# Patient Record
Sex: FEMALE | Race: WHITE
[De-identification: names, ages, dates, MRNs, and addresses within clinical notes are randomized per-mention and may not be internally consistent; named-entity substitution may affect disease eponyms.]

---

## 2017-12-21 ENCOUNTER — HOSPITAL ENCOUNTER (OUTPATIENT)
Dept: HOSPITAL 8 - OR | Age: 77
Discharge: HOME | End: 2017-12-21
Attending: PLASTIC SURGERY
Payer: COMMERCIAL

## 2017-12-21 VITALS — SYSTOLIC BLOOD PRESSURE: 114 MMHG | DIASTOLIC BLOOD PRESSURE: 71 MMHG

## 2017-12-21 DIAGNOSIS — Z87.891: ICD-10-CM

## 2017-12-21 DIAGNOSIS — N64.1: Primary | ICD-10-CM

## 2017-12-21 DIAGNOSIS — Z88.0: ICD-10-CM

## 2017-12-21 DIAGNOSIS — Z85.3: ICD-10-CM

## 2017-12-21 PROCEDURE — 14000 TIS TRNFR TRUNK 10 SQ CM/<: CPT

## 2018-03-28 ENCOUNTER — TELEPHONE (OUTPATIENT)
Dept: HEMATOLOGY ONCOLOGY | Facility: MEDICAL CENTER | Age: 78
End: 2018-03-28

## 2018-03-28 NOTE — TELEPHONE ENCOUNTER
Federica called inquiring about a genetic referral that was faxed the beginning of the year.  I do not have a referral on file for her.  Explained the process and emailed the questionnaire to [email or web address].  Contacted Dr. Torres office and they are re-faxing her referral

## 2018-04-05 ENCOUNTER — TELEPHONE (OUTPATIENT)
Dept: HEMATOLOGY ONCOLOGY | Facility: MEDICAL CENTER | Age: 78
End: 2018-04-05

## 2018-04-05 ENCOUNTER — OFFICE VISIT (OUTPATIENT)
Dept: HEMATOLOGY ONCOLOGY | Facility: MEDICAL CENTER | Age: 78
End: 2018-04-05
Payer: MEDICARE

## 2018-04-05 ENCOUNTER — NON-PROVIDER VISIT (OUTPATIENT)
Dept: HEMATOLOGY ONCOLOGY | Facility: MEDICAL CENTER | Age: 78
End: 2018-04-05
Payer: MEDICARE

## 2018-04-05 VITALS
BODY MASS INDEX: 26.27 KG/M2 | RESPIRATION RATE: 16 BRPM | OXYGEN SATURATION: 100 % | WEIGHT: 153.88 LBS | TEMPERATURE: 98.8 F | SYSTOLIC BLOOD PRESSURE: 140 MMHG | HEIGHT: 64 IN | HEART RATE: 90 BPM | DIASTOLIC BLOOD PRESSURE: 80 MMHG

## 2018-04-05 DIAGNOSIS — Z80.0 FAMILY HISTORY OF COLON CANCER: ICD-10-CM

## 2018-04-05 DIAGNOSIS — Z80.0 FAMILY HISTORY OF PANCREATIC CANCER: ICD-10-CM

## 2018-04-05 DIAGNOSIS — C50.111 MALIGNANT NEOPLASM OF CENTRAL PORTION OF RIGHT FEMALE BREAST, UNSPECIFIED ESTROGEN RECEPTOR STATUS (HCC): ICD-10-CM

## 2018-04-05 DIAGNOSIS — Z15.01 BREAST CANCER GENETIC SUSCEPTIBILITY: ICD-10-CM

## 2018-04-05 DIAGNOSIS — Z80.41 FAMILY HISTORY OF OVARIAN CANCER: ICD-10-CM

## 2018-04-05 DIAGNOSIS — Z80.49 FAMILY HISTORY OF UTERINE CANCER: ICD-10-CM

## 2018-04-05 DIAGNOSIS — Z80.8: ICD-10-CM

## 2018-04-05 PROCEDURE — 99204 OFFICE O/P NEW MOD 45 MIN: CPT | Performed by: MEDICAL GENETICS

## 2018-04-05 PROCEDURE — 36415 COLL VENOUS BLD VENIPUNCTURE: CPT | Performed by: MEDICAL GENETICS

## 2018-04-05 RX ORDER — GABAPENTIN 100 MG/1
100 CAPSULE ORAL 3 TIMES DAILY
COMMUNITY
End: 2019-05-14

## 2018-04-05 RX ORDER — ANASTROZOLE 1 MG/1
1 TABLET ORAL DAILY
COMMUNITY
End: 2023-09-06

## 2018-04-05 RX ORDER — IBUPROFEN 200 MG
950 CAPSULE ORAL DAILY
COMMUNITY
End: 2019-05-14

## 2018-04-05 RX ORDER — M-VIT,TX,IRON,MINS/CALC/FOLIC 27MG-0.4MG
1 TABLET ORAL DAILY
COMMUNITY
End: 2023-09-06

## 2018-04-05 RX ORDER — AMOXICILLIN 250 MG
1 CAPSULE ORAL DAILY
COMMUNITY
End: 2023-09-06

## 2018-04-05 ASSESSMENT — PAIN SCALES - GENERAL: PAINLEVEL: NO PAIN

## 2018-04-05 NOTE — PROGRESS NOTES
"Federica Figueroa is a 77 y.o. year old patient who was referred for cancer genetic risk assessment     Chief Complaint:   Chief Complaint   Patient presents with   • New Patient     Ref:  Dx: Breast Cancer       HPI: The patient was well until 5 months ago at which time a suspicious physical exam caused the patient to be referred for imaging . A suspicious mammogram study identified a (right) breast mass. A biopsy ultrasound guided was performed and a specimen was submitted to pathology.     A diagnosis of lobular carcinoma was made.   Review of personal and family histories suggested that a cancer genetic risk assessment was in order.    Location right Severity moderate Timing recent Modifying Factors none  Quality none     Duration 5 months           Context none           Associated Signs and Symptoms none    Review of Systems (ROS):  Constitutional normal   Eyes normal   ENT recent tinnitus   Respiratory normal   Cardiovascular normal   Gastrointestinal normal   Genitourinary normal   Musculoskeletal normal   Skin normal   Neurological normal   Endocrine normal   Psychiatric normal   Hematologic/lymphatic normal   Allergic/Immunologic sensitivities to penicillins only  Remaining systems negative? Yes  Total review of symptoms  >10:       History (Past/Family/ROS)  Family History:    3 generation pedigree built  Yes   Luceroer-Cuca empiric risk calculation No   Toan II empiric risk calculation  No  Social History     Yes  Patient Past History     Yes  History areas assessed   3:     Physical Exam  Constitutional    Encounter Vitals  Standard Vitals  Vitals  Blood Pressure : 140/80  Temperature: 37.1 °C (98.8 °F)  Pulse: 90  Respiration: 16  Pulse Oximetry: 100 %  Height: 162.6 cm (5' 4\")  Weight: 69.8 kg (153 lb 14.1 oz)  Encounter Vitals  Temperature: 37.1 °C (98.8 °F)  Temp Source: Temporal  Blood Pressure : 140/80  BP Location: Left, Upper Arm  Patient BP Position: Sitting  Pulse: 90  Respiration: " "16  Pulse Oximetry: 100 %  Weight: 69.8 kg (153 lb 14.1 oz)  How Weight Obtained: Stand Up Scale  Height: 162.6 cm (5' 4\")  BMI (Calculated): 26.41  Pain Score: No pain  Pulmonary-Specific Vitals     Durable Medical Equipment-Specific Vitals              General appearance: normal    Head Circumference: 22 inches  Eyes    Conjunctiva: clear   Pupils: reactive to light and accomodation     ENMT    External inspection: normal    Assessment of Hearing: normal    Lips/cheeks/gums: no lesions  Neck   External exam: normal    Thyroid: no masses   Respiratory   Auscultation: clear in all fields      Cardiovascular   Auscultation: RRR with no murmers   Edema : none  Chest   Breasts (exam): not done   Palpation:   GI   External exam and palpation: normal      Pelvic (female): not done   External exam (male):   Lymphatic   Palpation in 2 areas: normal     Musculoskeletal   Gait: normal    Digits and Nails: no lesions  Skin   Inspection: normal    Palpation: no masses   Neurologic   Cranial nerves: intact   DTR’s: 1+ and equal  PE summary    18 bullets (of 25 total):     Problem Points   New, further evaluation planned (4)    Moderate  risk    DATA POINTS   >4:     PROBLEM POINTS  >4:   RISK  Intermediate    TYPE OF MEDICAL DECISION MAKING High      60 minutes TIME (FACE TO FACE) AND DOCUMENTED  DOCUMENTATION DESCRIBE CONTENTS OF COUNSELING/CARE COORDINATION  DOCUMENTATION SUPPORT >50% TIME SPENT IN COUNSELING/COORDINATION        00936 (60 minutes)  with Moderate complexity    Patient with diagnosis of breast cancer and family history of ovarian cancer (2 family members), uterine cancer and colorectal cancer    Ambry panel sent                         "

## 2018-04-05 NOTE — TELEPHONE ENCOUNTER
Patient called and would like to speak to you regarding her exam she had today? She didn't want to give me any other information she was strictly asking for JG Thompson

## 2018-04-06 VITALS
WEIGHT: 153.88 LBS | RESPIRATION RATE: 16 BRPM | BODY MASS INDEX: 26.27 KG/M2 | OXYGEN SATURATION: 100 % | HEART RATE: 90 BPM | TEMPERATURE: 98.8 F | HEIGHT: 64 IN

## 2018-04-06 ASSESSMENT — PAIN SCALES - GENERAL: PAINLEVEL: NO PAIN

## 2018-04-06 NOTE — PROGRESS NOTES
Federica Figueroa is a 77 y.o. female here for a non-provider visit for: Lab Draws  on 4/6/2018 at 2:28 PM    Procedure Performed: Venipuncture     Anatomical site: Right Wrist     Equipment used: 23g Butterfly    Labs drawn: [employer]    Ordering Provider: Dr. Mir Fagan    Dyllan By: Donna Lazo, Med Ass't   No complications.  was present the whole time when I was drawing this patients labs.

## 2019-05-14 ENCOUNTER — HOSPITAL ENCOUNTER (OUTPATIENT)
Facility: MEDICAL CENTER | Age: 79
End: 2019-05-14
Attending: INTERNAL MEDICINE
Payer: MEDICARE

## 2019-05-14 ENCOUNTER — APPOINTMENT (OUTPATIENT)
Dept: ADMISSIONS | Facility: MEDICAL CENTER | Age: 79
End: 2019-05-14
Attending: PLASTIC SURGERY
Payer: COMMERCIAL

## 2019-05-14 DIAGNOSIS — Z01.810 PRE-OPERATIVE CARDIOVASCULAR EXAMINATION: ICD-10-CM

## 2019-05-14 DIAGNOSIS — Z01.812 PRE-OPERATIVE LABORATORY EXAMINATION: ICD-10-CM

## 2019-05-14 LAB
ALBUMIN SERPL BCP-MCNC: 4.3 G/DL (ref 3.2–4.9)
ALBUMIN/GLOB SERPL: 1.4 G/DL
ALP SERPL-CCNC: 81 U/L (ref 30–99)
ALT SERPL-CCNC: 13 U/L (ref 2–50)
ANION GAP SERPL CALC-SCNC: 7 MMOL/L (ref 0–11.9)
AST SERPL-CCNC: 22 U/L (ref 12–45)
BASOPHILS # BLD AUTO: 1.2 % (ref 0–1.8)
BASOPHILS # BLD: 0.05 K/UL (ref 0–0.12)
BILIRUB SERPL-MCNC: 0.6 MG/DL (ref 0.1–1.5)
BUN SERPL-MCNC: 11 MG/DL (ref 8–22)
CALCIUM SERPL-MCNC: 10.3 MG/DL (ref 8.5–10.5)
CHLORIDE SERPL-SCNC: 104 MMOL/L (ref 96–112)
CO2 SERPL-SCNC: 25 MMOL/L (ref 20–33)
CREAT SERPL-MCNC: 0.84 MG/DL (ref 0.5–1.4)
EKG IMPRESSION: NORMAL
EOSINOPHIL # BLD AUTO: 0.14 K/UL (ref 0–0.51)
EOSINOPHIL NFR BLD: 3.4 % (ref 0–6.9)
ERYTHROCYTE [DISTWIDTH] IN BLOOD BY AUTOMATED COUNT: 46.7 FL (ref 35.9–50)
GLOBULIN SER CALC-MCNC: 3 G/DL (ref 1.9–3.5)
GLUCOSE SERPL-MCNC: 93 MG/DL (ref 65–99)
HCT VFR BLD AUTO: 44.4 % (ref 37–47)
HGB BLD-MCNC: 14.4 G/DL (ref 12–16)
IMM GRANULOCYTES # BLD AUTO: 0.01 K/UL (ref 0–0.11)
IMM GRANULOCYTES NFR BLD AUTO: 0.2 % (ref 0–0.9)
LYMPHOCYTES # BLD AUTO: 1.37 K/UL (ref 1–4.8)
LYMPHOCYTES NFR BLD: 33.2 % (ref 22–41)
MCH RBC QN AUTO: 32.4 PG (ref 27–33)
MCHC RBC AUTO-ENTMCNC: 32.4 G/DL (ref 33.6–35)
MCV RBC AUTO: 99.8 FL (ref 81.4–97.8)
MONOCYTES # BLD AUTO: 0.35 K/UL (ref 0–0.85)
MONOCYTES NFR BLD AUTO: 8.5 % (ref 0–13.4)
NEUTROPHILS # BLD AUTO: 2.21 K/UL (ref 2–7.15)
NEUTROPHILS NFR BLD: 53.5 % (ref 44–72)
NRBC # BLD AUTO: 0 K/UL
NRBC BLD-RTO: 0 /100 WBC
PLATELET # BLD AUTO: 225 K/UL (ref 164–446)
PMV BLD AUTO: 9.1 FL (ref 9–12.9)
POTASSIUM SERPL-SCNC: 4.4 MMOL/L (ref 3.6–5.5)
PROT SERPL-MCNC: 7.3 G/DL (ref 6–8.2)
RBC # BLD AUTO: 4.45 M/UL (ref 4.2–5.4)
SODIUM SERPL-SCNC: 136 MMOL/L (ref 135–145)
WBC # BLD AUTO: 4.1 K/UL (ref 4.8–10.8)

## 2019-05-14 PROCEDURE — 85025 COMPLETE CBC W/AUTO DIFF WBC: CPT

## 2019-05-14 PROCEDURE — 86300 IMMUNOASSAY TUMOR CA 15-3: CPT

## 2019-05-14 PROCEDURE — 80053 COMPREHEN METABOLIC PANEL: CPT

## 2019-05-14 PROCEDURE — 36415 COLL VENOUS BLD VENIPUNCTURE: CPT

## 2019-05-14 RX ORDER — MULTIVIT WITH MINERALS/LUTEIN
1000 TABLET ORAL DAILY
COMMUNITY
End: 2023-09-06

## 2019-05-16 LAB — CANCER AG27-29 SERPL-ACNC: 17.8 U/ML (ref 0–40)

## 2019-05-23 ENCOUNTER — ANESTHESIA EVENT (OUTPATIENT)
Dept: SURGERY | Facility: MEDICAL CENTER | Age: 79
End: 2019-05-23
Payer: COMMERCIAL

## 2019-05-23 ENCOUNTER — HOSPITAL ENCOUNTER (OUTPATIENT)
Facility: MEDICAL CENTER | Age: 79
End: 2019-05-23
Attending: PLASTIC SURGERY | Admitting: PLASTIC SURGERY
Payer: COMMERCIAL

## 2019-05-23 ENCOUNTER — ANESTHESIA (OUTPATIENT)
Dept: SURGERY | Facility: MEDICAL CENTER | Age: 79
End: 2019-05-23
Payer: COMMERCIAL

## 2019-05-23 VITALS
WEIGHT: 135.36 LBS | RESPIRATION RATE: 16 BRPM | HEART RATE: 96 BPM | SYSTOLIC BLOOD PRESSURE: 139 MMHG | OXYGEN SATURATION: 98 % | HEIGHT: 64 IN | TEMPERATURE: 98.3 F | BODY MASS INDEX: 23.11 KG/M2 | DIASTOLIC BLOOD PRESSURE: 67 MMHG

## 2019-05-23 PROCEDURE — 700105 HCHG RX REV CODE 258: Performed by: PLASTIC SURGERY

## 2019-05-23 PROCEDURE — 700102 HCHG RX REV CODE 250 W/ 637 OVERRIDE(OP): Performed by: ANESTHESIOLOGY

## 2019-05-23 PROCEDURE — 500438 HCHG DRESSING, SPANDAGE #10 12: Performed by: PLASTIC SURGERY

## 2019-05-23 PROCEDURE — 160002 HCHG RECOVERY MINUTES (STAT): Performed by: PLASTIC SURGERY

## 2019-05-23 PROCEDURE — 700101 HCHG RX REV CODE 250: Performed by: PLASTIC SURGERY

## 2019-05-23 PROCEDURE — 160009 HCHG ANES TIME/MIN: Performed by: PLASTIC SURGERY

## 2019-05-23 PROCEDURE — 700111 HCHG RX REV CODE 636 W/ 250 OVERRIDE (IP): Performed by: ANESTHESIOLOGY

## 2019-05-23 PROCEDURE — 501838 HCHG SUTURE GENERAL: Performed by: PLASTIC SURGERY

## 2019-05-23 PROCEDURE — 501445 HCHG STAPLER, SKIN DISP: Performed by: PLASTIC SURGERY

## 2019-05-23 PROCEDURE — 500371 HCHG DRAIN, BLAKE 10MM: Performed by: PLASTIC SURGERY

## 2019-05-23 PROCEDURE — 160046 HCHG PACU - 1ST 60 MINS PHASE II: Performed by: PLASTIC SURGERY

## 2019-05-23 PROCEDURE — A9270 NON-COVERED ITEM OR SERVICE: HCPCS | Performed by: ANESTHESIOLOGY

## 2019-05-23 PROCEDURE — 500440 HCHG DRESSING, STERILE ROLL (KERLIX): Performed by: PLASTIC SURGERY

## 2019-05-23 PROCEDURE — 700101 HCHG RX REV CODE 250: Performed by: ANESTHESIOLOGY

## 2019-05-23 PROCEDURE — 160025 RECOVERY II MINUTES (STATS): Performed by: PLASTIC SURGERY

## 2019-05-23 PROCEDURE — A6454 SELF-ADHER BAND W>=3" <5"/YD: HCPCS | Performed by: PLASTIC SURGERY

## 2019-05-23 PROCEDURE — 500423 HCHG DRESSING, ABD COMBINE: Performed by: PLASTIC SURGERY

## 2019-05-23 PROCEDURE — 160036 HCHG PACU - EA ADDL 30 MINS PHASE I: Performed by: PLASTIC SURGERY

## 2019-05-23 PROCEDURE — 501836 HCHG SUTURE EYE: Performed by: PLASTIC SURGERY

## 2019-05-23 PROCEDURE — A6222 GAUZE <=16 IN NO W/SAL W/O B: HCPCS | Performed by: PLASTIC SURGERY

## 2019-05-23 PROCEDURE — 160048 HCHG OR STATISTICAL LEVEL 1-5: Performed by: PLASTIC SURGERY

## 2019-05-23 PROCEDURE — 160041 HCHG SURGERY MINUTES - EA ADDL 1 MIN LEVEL 4: Performed by: PLASTIC SURGERY

## 2019-05-23 PROCEDURE — 160035 HCHG PACU - 1ST 60 MINS PHASE I: Performed by: PLASTIC SURGERY

## 2019-05-23 PROCEDURE — 160029 HCHG SURGERY MINUTES - 1ST 30 MINS LEVEL 4: Performed by: PLASTIC SURGERY

## 2019-05-23 PROCEDURE — 500451 HCHG DRESSING, TOPIFOAM 8 X 12: Performed by: PLASTIC SURGERY

## 2019-05-23 RX ORDER — SODIUM CHLORIDE, SODIUM LACTATE, POTASSIUM CHLORIDE, CALCIUM CHLORIDE 600; 310; 30; 20 MG/100ML; MG/100ML; MG/100ML; MG/100ML
INJECTION, SOLUTION INTRAVENOUS CONTINUOUS
Status: DISCONTINUED | OUTPATIENT
Start: 2019-05-23 | End: 2019-05-23 | Stop reason: HOSPADM

## 2019-05-23 RX ORDER — MAGNESIUM SULFATE HEPTAHYDRATE 40 MG/ML
INJECTION, SOLUTION INTRAVENOUS PRN
Status: DISCONTINUED | OUTPATIENT
Start: 2019-05-23 | End: 2019-05-23 | Stop reason: SURG

## 2019-05-23 RX ORDER — SCOLOPAMINE TRANSDERMAL SYSTEM 1 MG/1
1 PATCH, EXTENDED RELEASE TRANSDERMAL
Status: DISCONTINUED | OUTPATIENT
Start: 2019-05-23 | End: 2019-05-23 | Stop reason: HOSPADM

## 2019-05-23 RX ORDER — LABETALOL HYDROCHLORIDE 5 MG/ML
5 INJECTION, SOLUTION INTRAVENOUS
Status: DISCONTINUED | OUTPATIENT
Start: 2019-05-23 | End: 2019-05-23 | Stop reason: HOSPADM

## 2019-05-23 RX ORDER — KETAMINE HYDROCHLORIDE 50 MG/ML
INJECTION, SOLUTION INTRAMUSCULAR; INTRAVENOUS PRN
Status: DISCONTINUED | OUTPATIENT
Start: 2019-05-23 | End: 2019-05-23 | Stop reason: SURG

## 2019-05-23 RX ORDER — GENTAMICIN SULFATE 40 MG/ML
INJECTION, SOLUTION INTRAMUSCULAR; INTRAVENOUS
Status: DISCONTINUED
Start: 2019-05-23 | End: 2019-05-23 | Stop reason: HOSPADM

## 2019-05-23 RX ORDER — DEXMEDETOMIDINE HYDROCHLORIDE 100 UG/ML
INJECTION, SOLUTION INTRAVENOUS PRN
Status: DISCONTINUED | OUTPATIENT
Start: 2019-05-23 | End: 2019-05-23 | Stop reason: SURG

## 2019-05-23 RX ORDER — HALOPERIDOL 5 MG/ML
1 INJECTION INTRAMUSCULAR
Status: DISCONTINUED | OUTPATIENT
Start: 2019-05-23 | End: 2019-05-23 | Stop reason: HOSPADM

## 2019-05-23 RX ORDER — BUPIVACAINE HYDROCHLORIDE 5 MG/ML
INJECTION, SOLUTION EPIDURAL; INTRACAUDAL
Status: DISCONTINUED
Start: 2019-05-23 | End: 2019-05-23 | Stop reason: HOSPADM

## 2019-05-23 RX ORDER — LIDOCAINE HYDROCHLORIDE 10 MG/ML
INJECTION, SOLUTION INFILTRATION; PERINEURAL
Status: COMPLETED
Start: 2019-05-23 | End: 2019-05-23

## 2019-05-23 RX ORDER — BALANCED SALT SOLUTION 6.4; .75; .48; .3; 3.9; 1.7 MG/ML; MG/ML; MG/ML; MG/ML; MG/ML; MG/ML
SOLUTION OPHTHALMIC
Status: DISCONTINUED | OUTPATIENT
Start: 2019-05-23 | End: 2019-05-23 | Stop reason: HOSPADM

## 2019-05-23 RX ORDER — HYDROMORPHONE HYDROCHLORIDE 1 MG/ML
0.2 INJECTION, SOLUTION INTRAMUSCULAR; INTRAVENOUS; SUBCUTANEOUS
Status: DISCONTINUED | OUTPATIENT
Start: 2019-05-23 | End: 2019-05-23 | Stop reason: HOSPADM

## 2019-05-23 RX ORDER — MIDAZOLAM HYDROCHLORIDE 1 MG/ML
1 INJECTION INTRAMUSCULAR; INTRAVENOUS
Status: DISCONTINUED | OUTPATIENT
Start: 2019-05-23 | End: 2019-05-23 | Stop reason: HOSPADM

## 2019-05-23 RX ORDER — DEXAMETHASONE SODIUM PHOSPHATE 4 MG/ML
INJECTION, SOLUTION INTRA-ARTICULAR; INTRALESIONAL; INTRAMUSCULAR; INTRAVENOUS; SOFT TISSUE PRN
Status: DISCONTINUED | OUTPATIENT
Start: 2019-05-23 | End: 2019-05-23 | Stop reason: SURG

## 2019-05-23 RX ORDER — OXYCODONE HCL 5 MG/5 ML
5 SOLUTION, ORAL ORAL
Status: COMPLETED | OUTPATIENT
Start: 2019-05-23 | End: 2019-05-23

## 2019-05-23 RX ORDER — DIPHENHYDRAMINE HYDROCHLORIDE 50 MG/ML
12.5 INJECTION INTRAMUSCULAR; INTRAVENOUS
Status: DISCONTINUED | OUTPATIENT
Start: 2019-05-23 | End: 2019-05-23 | Stop reason: HOSPADM

## 2019-05-23 RX ORDER — TETRACAINE HYDROCHLORIDE 5 MG/ML
SOLUTION OPHTHALMIC
Status: DISCONTINUED
Start: 2019-05-23 | End: 2019-05-23 | Stop reason: HOSPADM

## 2019-05-23 RX ORDER — CEFAZOLIN SODIUM 1 G/3ML
INJECTION, POWDER, FOR SOLUTION INTRAMUSCULAR; INTRAVENOUS
Status: DISCONTINUED
Start: 2019-05-23 | End: 2019-05-23 | Stop reason: HOSPADM

## 2019-05-23 RX ORDER — MEPERIDINE HYDROCHLORIDE 25 MG/ML
6.25 INJECTION INTRAMUSCULAR; INTRAVENOUS; SUBCUTANEOUS
Status: DISCONTINUED | OUTPATIENT
Start: 2019-05-23 | End: 2019-05-23 | Stop reason: HOSPADM

## 2019-05-23 RX ORDER — CEFAZOLIN SODIUM 1 G/3ML
INJECTION, POWDER, FOR SOLUTION INTRAMUSCULAR; INTRAVENOUS PRN
Status: DISCONTINUED | OUTPATIENT
Start: 2019-05-23 | End: 2019-05-23 | Stop reason: SURG

## 2019-05-23 RX ORDER — LIDOCAINE HYDROCHLORIDE 10 MG/ML
INJECTION, SOLUTION EPIDURAL; INFILTRATION; INTRACAUDAL; PERINEURAL
Status: DISCONTINUED
Start: 2019-05-23 | End: 2019-05-23 | Stop reason: HOSPADM

## 2019-05-23 RX ORDER — LIDOCAINE HYDROCHLORIDE AND EPINEPHRINE 5; 5 MG/ML; UG/ML
INJECTION, SOLUTION INFILTRATION; PERINEURAL
Status: DISCONTINUED | OUTPATIENT
Start: 2019-05-23 | End: 2019-05-23 | Stop reason: HOSPADM

## 2019-05-23 RX ORDER — BACITRACIN 50000 [IU]/1
INJECTION, POWDER, FOR SOLUTION INTRAMUSCULAR
Status: DISCONTINUED
Start: 2019-05-23 | End: 2019-05-23 | Stop reason: HOSPADM

## 2019-05-23 RX ORDER — ACETAMINOPHEN 500 MG
1000 TABLET ORAL ONCE
Status: COMPLETED | OUTPATIENT
Start: 2019-05-23 | End: 2019-05-23

## 2019-05-23 RX ORDER — DIPHENHYDRAMINE HYDROCHLORIDE 50 MG/ML
INJECTION INTRAMUSCULAR; INTRAVENOUS PRN
Status: DISCONTINUED | OUTPATIENT
Start: 2019-05-23 | End: 2019-05-23 | Stop reason: SURG

## 2019-05-23 RX ORDER — OXYCODONE HCL 5 MG/5 ML
10 SOLUTION, ORAL ORAL
Status: COMPLETED | OUTPATIENT
Start: 2019-05-23 | End: 2019-05-23

## 2019-05-23 RX ORDER — HYDRALAZINE HYDROCHLORIDE 20 MG/ML
5 INJECTION INTRAMUSCULAR; INTRAVENOUS
Status: DISCONTINUED | OUTPATIENT
Start: 2019-05-23 | End: 2019-05-23 | Stop reason: HOSPADM

## 2019-05-23 RX ORDER — HYDROMORPHONE HYDROCHLORIDE 1 MG/ML
0.1 INJECTION, SOLUTION INTRAMUSCULAR; INTRAVENOUS; SUBCUTANEOUS
Status: DISCONTINUED | OUTPATIENT
Start: 2019-05-23 | End: 2019-05-23 | Stop reason: HOSPADM

## 2019-05-23 RX ORDER — HYDROMORPHONE HYDROCHLORIDE 1 MG/ML
0.4 INJECTION, SOLUTION INTRAMUSCULAR; INTRAVENOUS; SUBCUTANEOUS
Status: DISCONTINUED | OUTPATIENT
Start: 2019-05-23 | End: 2019-05-23 | Stop reason: HOSPADM

## 2019-05-23 RX ORDER — HYDROMORPHONE HYDROCHLORIDE 2 MG/ML
INJECTION, SOLUTION INTRAMUSCULAR; INTRAVENOUS; SUBCUTANEOUS PRN
Status: DISCONTINUED | OUTPATIENT
Start: 2019-05-23 | End: 2019-05-23 | Stop reason: SURG

## 2019-05-23 RX ORDER — ONDANSETRON 2 MG/ML
4 INJECTION INTRAMUSCULAR; INTRAVENOUS
Status: COMPLETED | OUTPATIENT
Start: 2019-05-23 | End: 2019-05-23

## 2019-05-23 RX ORDER — ONDANSETRON 2 MG/ML
INJECTION INTRAMUSCULAR; INTRAVENOUS PRN
Status: DISCONTINUED | OUTPATIENT
Start: 2019-05-23 | End: 2019-05-23 | Stop reason: SURG

## 2019-05-23 RX ORDER — LIDOCAINE HYDROCHLORIDE 5 MG/ML
INJECTION, SOLUTION INFILTRATION; INTRAVENOUS
Status: DISCONTINUED
Start: 2019-05-23 | End: 2019-05-23 | Stop reason: HOSPADM

## 2019-05-23 RX ORDER — EPINEPHRINE 1 MG/ML(1)
AMPUL (ML) INJECTION
Status: DISCONTINUED
Start: 2019-05-23 | End: 2019-05-23 | Stop reason: HOSPADM

## 2019-05-23 RX ADMIN — ONDANSETRON 4 MG: 2 INJECTION INTRAMUSCULAR; INTRAVENOUS at 15:37

## 2019-05-23 RX ADMIN — ACETAMINOPHEN 1000 MG: 500 TABLET ORAL at 07:20

## 2019-05-23 RX ADMIN — DEXMEDETOMIDINE HYDROCHLORIDE 40 MCG: 100 INJECTION, SOLUTION INTRAVENOUS at 08:20

## 2019-05-23 RX ADMIN — FENTANYL CITRATE 25 MCG: 50 INJECTION INTRAMUSCULAR; INTRAVENOUS at 13:10

## 2019-05-23 RX ADMIN — ONDANSETRON 4 MG: 2 INJECTION INTRAMUSCULAR; INTRAVENOUS at 08:38

## 2019-05-23 RX ADMIN — MAGNESIUM SULFATE IN WATER 1 G: 40 INJECTION, SOLUTION INTRAVENOUS at 08:07

## 2019-05-23 RX ADMIN — DIPHENHYDRAMINE HYDROCHLORIDE 12.5 MG: 50 INJECTION, SOLUTION INTRAMUSCULAR; INTRAVENOUS at 10:57

## 2019-05-23 RX ADMIN — MIDAZOLAM HYDROCHLORIDE 2 MG: 1 INJECTION, SOLUTION INTRAMUSCULAR; INTRAVENOUS at 07:29

## 2019-05-23 RX ADMIN — CEFAZOLIN 2 G: 330 INJECTION, POWDER, FOR SOLUTION INTRAMUSCULAR; INTRAVENOUS at 07:31

## 2019-05-23 RX ADMIN — HYDROMORPHONE HYDROCHLORIDE 0.2 MG: 1 INJECTION, SOLUTION INTRAMUSCULAR; INTRAVENOUS; SUBCUTANEOUS at 14:30

## 2019-05-23 RX ADMIN — DEXMEDETOMIDINE HYDROCHLORIDE 40 MCG: 100 INJECTION, SOLUTION INTRAVENOUS at 07:38

## 2019-05-23 RX ADMIN — SODIUM CHLORIDE, POTASSIUM CHLORIDE, SODIUM LACTATE AND CALCIUM CHLORIDE: 600; 310; 30; 20 INJECTION, SOLUTION INTRAVENOUS at 06:29

## 2019-05-23 RX ADMIN — SUGAMMADEX 200 MG: 100 INJECTION, SOLUTION INTRAVENOUS at 10:57

## 2019-05-23 RX ADMIN — HYDROMORPHONE HYDROCHLORIDE 0.2 MG: 1 INJECTION, SOLUTION INTRAMUSCULAR; INTRAVENOUS; SUBCUTANEOUS at 14:43

## 2019-05-23 RX ADMIN — LIDOCAINE HYDROCHLORIDE 0.5 ML: 10 INJECTION, SOLUTION INFILTRATION; PERINEURAL at 06:29

## 2019-05-23 RX ADMIN — EPHEDRINE SULFATE 10 MG: 50 INJECTION INTRAMUSCULAR; INTRAVENOUS; SUBCUTANEOUS at 08:10

## 2019-05-23 RX ADMIN — KETAMINE HYDROCHLORIDE 50 MG: 50 INJECTION, SOLUTION INTRAMUSCULAR; INTRAVENOUS at 07:31

## 2019-05-23 RX ADMIN — PROPOFOL 200 MG: 10 INJECTION, EMULSION INTRAVENOUS at 07:31

## 2019-05-23 RX ADMIN — ROCURONIUM BROMIDE 50 MG: 10 INJECTION, SOLUTION INTRAVENOUS at 07:31

## 2019-05-23 RX ADMIN — SODIUM CHLORIDE, POTASSIUM CHLORIDE, SODIUM LACTATE AND CALCIUM CHLORIDE: 600; 310; 30; 20 INJECTION, SOLUTION INTRAVENOUS at 10:32

## 2019-05-23 RX ADMIN — OXYCODONE HYDROCHLORIDE 10 MG: 5 SOLUTION ORAL at 12:50

## 2019-05-23 RX ADMIN — HYDROMORPHONE HYDROCHLORIDE 1 MG: 2 INJECTION, SOLUTION INTRAMUSCULAR; INTRAVENOUS; SUBCUTANEOUS at 07:31

## 2019-05-23 RX ADMIN — FENTANYL CITRATE 25 MCG: 50 INJECTION INTRAMUSCULAR; INTRAVENOUS at 12:55

## 2019-05-23 RX ADMIN — CEFAZOLIN 1 G: 330 INJECTION, POWDER, FOR SOLUTION INTRAMUSCULAR; INTRAVENOUS at 10:57

## 2019-05-23 RX ADMIN — DEXAMETHASONE SODIUM PHOSPHATE 8 MG: 4 INJECTION, SOLUTION INTRA-ARTICULAR; INTRALESIONAL; INTRAMUSCULAR; INTRAVENOUS; SOFT TISSUE at 07:31

## 2019-05-23 ASSESSMENT — PAIN SCALES - GENERAL: PAIN_LEVEL: 2

## 2019-05-23 NOTE — ANESTHESIA QCDR
[facility] Clinical Data Registry (for Quality Improvement)     Postoperative nausea/vomiting risk protocol (Adult = 18 yrs and Pediatric 3-17 yrs)- (430 and 463)  General inhalation anesthetic (NOT TIVA) with PONV risk factors: Yes  Provision of anti-emetic therapy with at least 2 different classes of agents: Yes   Patient DID NOT receive anti-emetic therapy and reason is documented in Medical Record:  N/A    Multimodal Pain Management- (AQI59)  Patient undergoing Elective Surgery (i.e. Outpatient, or ASC, or Prescheduled Surgery prior to Hospital Admission): Yes  Use of Multimodal Pain Management, two or more drugs and/or interventions, NOT including systemic opioids: Yes   Exception: Documented allergy to multiple classes of analgesics:  N/A    PACU assessment of acute postoperative pain prior to Anesthesia Care End- Applies to Patients Age = 18- (ABG7)  Initial PACU pain score is which of the following: < 7/10  Patient unable to report pain score: N/A    Post-anesthetic transfer of care checklist/protocol to PACU/ICU- (426 and 427)  Upon conclusion of case, patient transferred to which of the following locations: PACU/Non-ICU  Use of transfer checklist/protocol: Yes  Exclusion: Service Performed in Patient Hospital Room (and thus did not require transfer): N/A    PACU Reintubation- (AQI31)  General anesthesia requiring endotracheal intubation (ETT) along with subsequent extubation in OR or PACU: Yes  Required reintubation in the PACU: No   Extubation was a planned trial documented in the medical record prior to removal of the original airway device:  N/A    Unplanned admission to ICU related to anesthesia service up through end of PACU care- (MD51)  Unplanned admission to ICU (not initially anticipated at anesthesia start time): No

## 2019-05-23 NOTE — ANESTHESIA POSTPROCEDURE EVALUATION
Patient: Federica Figueroa    Procedure Summary     Date:  05/23/19 Room / Location:  [facility] ROOM 28 / SURGERY SAME DAY [facility]    Anesthesia Start:  0729 Anesthesia Stop:  1147    Procedures:       RECONSTRUCTION, BREAST (Bilateral Breast)      FLAP GRAFT - REVISION DERMAL GLANDULAR  AND FAT GRAFTING (Breast)      BLEPHAROPLASTY- UPPER (Bilateral Eye)      RHYTIDECTOMY, FACE- FACELIFT WITH PLATYSMAPLASTY AND FAT INJECTIONS W/LIP LIFT (N/A Face)      RHYTIDECTOMY, FOREHEAD - LATERAL (N/A Face) Diagnosis:  (MALIGNANT NEOPLASM OF UPPER OUTER QUADRANT OF RIGHT BREAST, PERSONAL HISTORY OF MALIGNANT NEOPLASM OF BREAST)    Surgeon:  Richard Moreno M.D. Responsible Provider:  Paxton Mahajan M.D.    Anesthesia Type:  general ASA Status:  1          Final Anesthesia Type: general  Last vitals  BP   Blood Pressure : 139/67    Temp   37 °C (98.6 °F)    Pulse   Pulse: 76   Resp   18    SpO2   98 %      Anesthesia Post Evaluation    Patient location during evaluation: PACU  Patient participation: complete - patient participated  Level of consciousness: sleepy but conscious  Pain score: 2    Airway patency: patent  Anesthetic complications: no  Cardiovascular status: hemodynamically stable  Respiratory status: acceptable  Hydration status: euvolemic    PONV: controlled           Nurse Pain Score: 0 (NPRS)

## 2019-05-23 NOTE — DISCHARGE INSTRUCTIONS
ACTIVITY: Rest and take it easy for the first 24 hours.  A responsible adult is recommended to remain with you during that time.  It is normal to feel sleepy.  We encourage you to not do anything that requires balance, judgment or coordination.    MILD FLU-LIKE SYMPTOMS ARE NORMAL. YOU MAY EXPERIENCE GENERALIZED MUSCLE ACHES, THROAT IRRITATION, HEADACHE AND/OR SOME NAUSEA.    FOR 24 HOURS DO NOT:  Drive, operate machinery or run household appliances.  Drink beer or alcoholic beverages.   Make important decisions or sign legal documents.    SPECIAL INSTRUCTIONS:  Empty drains and keep track of the output on sheet provided.  Keep your head of bed elevated.  Ice packs as needed.    DIET: To avoid nausea, slowly advance diet as tolerated, avoiding spicy or greasy foods for the first day.  Add more substantial food to your diet according to your physician's instructions.  Babies can be fed formula or breast milk as soon as they are hungry.  INCREASE FLUIDS AND FIBER TO AVOID CONSTIPATION.    SURGICAL DRESSING/BATHING:  Do not shower until drains are removed. Do not remove dressing until follow-up. Moist eye pads and cool compresses to eyes (provided).    FOLLOW-UP APPOINTMENT:  A follow-up appointment should be arranged with your doctor as scheduled tomorrow.    You should CALL YOUR PHYSICIAN if you develop:  Fever greater than 101 degrees F.  Pain not relieved by medication, or persistent nausea or vomiting.  Excessive bleeding (blood soaking through dressing) or unexpected drainage from the wound.  Extreme redness or swelling around the incision site, drainage of pus or foul smelling drainage.  Inability to urinate or empty your bladder within 8 hours.  Problems with breathing or chest pain.    You should call 911 if you develop problems with breathing or chest pain.  If you are unable to contact your doctor or surgical center, you should go to the nearest emergency room or urgent care center.      Physician's  telephone #: [phone]~ Dr Moreno    If any questions arise, call your doctor.  If your doctor is not available, please feel free to call the Surgical Center at [phone].  The Center is open Monday through Friday from 7AM to 7PM.  You can also call the HEALTH HOTLINE open 24 hours/day, 7 days/week and speak to a nurse at [phone], or toll free at [phone].    A registered nurse may call you a few days after your surgery to see how you are doing after your procedure.    MEDICATIONS: Resume taking daily medication.  Take prescribed pain medication with food.  If no medication is prescribed, you may take non-aspirin pain medication if needed.  PAIN MEDICATION CAN BE VERY CONSTIPATING.  Take a stool softener or laxative such as senokot, pericolace, or milk of magnesia if needed.    Prescriptions given at pre-op visit.  Last pain medication given at 12:45pm.    If your physician has prescribed pain medication that includes Acetaminophen (Tylenol), do not take additional Acetaminophen (Tylenol) while taking the prescribed medication.    Depression / Suicide Risk    As you are discharged from this [facility] facility, it is important to learn how to keep safe from harming yourself.    Recognize the warning signs:  · Abrupt changes in personality, positive or negative- including increase in energy   · Giving away possessions  · Change in eating patterns- significant weight changes-  positive or negative  · Change in sleeping patterns- unable to sleep or sleeping all the time   · Unwillingness or inability to communicate  · Depression  · Unusual sadness, discouragement and loneliness  · Talk of wanting to die  · Neglect of personal appearance   · Rebelliousness- reckless behavior  · Withdrawal from people/activities they love  · Confusion- inability to concentrate     If you or a loved one observes any of these behaviors or has concerns about self-harm, here's what you can do:  · Talk about it- your  feelings and reasons for harming yourself  · Remove any means that you might use to hurt yourself (examples: pills, rope, extension cords, firearm)  · Get professional help from the community (Mental Health, Substance Abuse, psychological counseling)  · Do not be alone:Call your Safe Contact- someone whom you trust who will be there for you.  · Call your local CRISIS HOTLINE 926-9029 or 247-646-2776  · Call your local Children's Mobile Crisis Response Team [place] (804) 638-0115 or www.[employer]  · Call the toll free National Suicide Prevention Hotlines   · National Suicide Prevention Lifeline 703-273-EXSX (0420)  · National Hope Line Network 800-SUICIDE (415-9323)

## 2019-05-23 NOTE — ANESTHESIA PROCEDURE NOTES
Airway  Date/Time: 5/23/2019 7:32 AM  Performed by: MITCHEL CARREON  Authorized by: MITCHEL CARREON     Location:  OR  Urgency:  Elective  Indications for Airway Management:  Anesthesia  Spontaneous Ventilation: absent    Sedation Level:  Deep  Preoxygenated: Yes    Patient Position:  Sniffing  Mask Difficulty Assessment:  1 - vent by mask  Final Airway Type:  Endotracheal airway  Final Endotracheal Airway:  ETT  Cuffed: Yes    Technique Used for Successful ETT Placement:  Direct laryngoscopy  Insertion Site:  Oral  Blade Type:  Miguelito  Laryngoscope Blade/Videolaryngoscope Blade Size:  3  ETT Size (mm):  7.0  Measured from:  Teeth  ETT to Teeth (cm):  21  Placement Verified by: auscultation and capnometry    Cormack-Lehane Classification:  Grade IIb - view of arytenoids or posterior of glottis only  Number of Attempts at Approach:  1

## 2019-05-23 NOTE — OR NURSING
1145- Pt arrives from OR and report received.  Oral airway in place.  Bilat YANELY drains to neck.  Dressing to face and eyes.  Fat grafting sites on abd CDI.  HOB at 45 degrees approx.    1212- Pt eleni to open mouth and squeeze hands when asked, oral airway removed.    1240- Pt able to tolerate ice chips, pt's  not in lobby at this time.    1250- Pt medicated with po oxycodone.    1255- Pt sts pain 9/10, medicated with 25mcg IV fent.    1310- Pt drowsy, but appears more comfortable, sts pain is now 8/10, medicated with another 25mcg IV fent.  Ice pack placed to eyes.    1320- Report to Cindy.    1415- Update from Cindy.   at bedside.    1430- Pt medicated with 0.2mg dilaudid for pain 7/10.  Ice packs placed to bilat cheeks.    1443- Pt medicated with another 0.2mg dilaudid for pain 6/10.  Pt sts she wants to stay here and sleep longer.    1520- Pt sts pain down to 4-5/10, spoke with pt and  about next dose of pain meds, pt sts she could probably go home soon.    1535- Eye pads changed to bilat eyes, drains emptied and drain teaching provided.  Pt and  provided with d/c paperwork and instructions, scripts provided previously at Dr Moreno's office.  All questions answered.  Pt changed into her clothes with help, drains pinned to shirt.    1610- Pt taken to bathroom, able to void small amount.    1616- IV removed, pt taken out via w/c.

## 2019-05-23 NOTE — ANESTHESIA PREPROCEDURE EVALUATION
Relevant Problems   No relevant active problems       Physical Exam    Airway   Mallampati: II  TM distance: >3 FB  Neck ROM: full       Cardiovascular - normal exam  Rhythm: regular  Rate: normal  (-) murmur     Dental - normal exam         Pulmonary - normal exam  Breath sounds clear to auscultation     Abdominal   Abdomen: soft  Bowel sounds: normal   Neurological - normal exam                 Anesthesia Plan    ASA 1       Plan - general             Induction: intravenous    Postoperative Plan: Postoperative administration of opioids is intended.    Pertinent diagnostic labs and testing reviewed    Informed Consent:    Anesthetic plan and risks discussed with patient.    Use of blood products discussed with: patient whom consented to blood products.

## 2019-05-23 NOTE — OR NURSING
1315 Pt report received from Pamela DUNCAN. Pt sleeping appears comfortable.     1336 Pt continues to sleep.     1351 Pt continues to sleep soundly.  now here at bedside.     1401 Pt continues to sleep.  remains at bedside.    1409 Report back to Pamela DUNCAN.

## 2019-05-23 NOTE — OP REPORT
DATE OF SERVICE:  05/23/2019    PREOPERATIVE DIAGNOSIS:  Cosmetic facial concerns.    POSTOPERATIVE DIAGNOSIS:  Cosmetic facial concerns.    PROCEDURES:  1.  Full face and neck lift with midline platysmaplasty, lateral platysmal   division, and SMAS elevation and plication.  2.  Bilateral upper lid blepharoplasty with lateral browpexy.  3.  Bilateral lip lift through a nasal base excision.  4.  Facial fat grafting.    ATTENDING SURGEON:  Richard Moreno MD    ANESTHESIOLOGIST:  Paxton Mahajan MD    ANESTHESIA TYPE:  General.    SPECIMENS:  None.    ASSISTANT:  Sulema Zamarripa CST FA    ESTIMATED BLOOD LOSS:  Minimal.    COMPLICATIONS:  No apparent.    INDICATIONS FOR PROCEDURE:  The patient is a 78-year-old woman who is well   known to me, who had previously underwent bilateral mastectomies,   reconstruction with tissue expander and implants.  The patient also had had a   previous facelift a number of years ago.  The patient wishes to have a   secondary facelift.  We discussed upper lid blepharoplasty with lateral   browpexy, upper lip lift through a nasal base excision, and facial fat   grafting.  The patient now presents for this operation in conjunction with   further fat grafting revision of her reconstructed breast.    INTRAOPERATIVE FINDINGS:  For the fat grafting portion of the operation, there   was 6 mL of fat grafted in the malar eminence, 2 mL into the nasolabial fold   and upper lip, and 2 mL in the bilateral melolabial folds and chin, for a   total of 20 mL of fat in total.    For the cosmetic portion of the operation, it started around 9:00 a.m. to   completion at around 11:40 a.m.    PROCEDURE DETAILS:  After the operative and nonoperative options were   discussed and include was not limited to bleeding, infection, damage to   surrounding structures, need for further surgery, reaction to anesthetic   agents, scarring, facial asymmetry, wound healing difficulties, injury to   motor and/or  sensory nerves, permanent inability to move muscles of the face,   permanent or temporary numbness of the face, wound healing difficulties, deep   venous thrombosis, corneal abrasion, lagophthalmos, ectropion, entropion,   blindness, asymmetric fat survival, dissatisfaction with facial appearance,   wound healing difficulties, unsatisfactory result and/or death, informed   consent was obtained.    The patient was identified.  In a sitting upright position, the planned   blepharoplasty incisions were marked out using the patient's old   blepharoplasty scars and a very conservative amount of skin was planned for   resection.  The mid pupil and the lateral limbus were marked out on the brow   for elevation.  Then, the facelift incisions were marked out using the   patient's old facelift scars.  A pre-temporal hairline incision and an   intertragal incision were then marked out going into the postauricular crease   back along the posterior hairline.  The medial and lateral borders of the   platysma muscle were marked out.  A submental incision was marked for the   midline platysmaplasty.  The lip lift was then marked out along the nasal base   in a gull wing shaped fashion.  Areas for pocket dissections were marked out   on the face and the neck.  Antibiotics given, sequential compression devices   placed.  The patient was brought back to the operating suite where general   anesthesia was induced.  Please see the separate procedure note for the   revision of the reconstructed breast and fat grafting.  After this was then   completed, the face was then prepped and draped in usual sterile fashion.    Corneal shields were then placed in the eyes with Lacrilube.  Local anesthetic   was injected in the upper eyelids and brow, into the facelift and into the   neck.  Then, tumescent solution was then placed in the face and neck.    Adequate time was allowed for the hemostatic effects of epinephrine to take   effect.  On the  upper eyelid, the planned surgical incisions were then incised   with a 15 blade.  Needle point cautery was then used to remove the tissue off   the underlying orbicularis muscle.  Dissection was then carried into the   nasal fat pad and a moderate amount of nasal fat pad was removed from the   upper lid incision.  I did not remove any other fat in the mid and lateral   portions.  Then, on the lateral aspect of the blepharoplasty incisions, the   patient did have a fair amount of fullness that was present.  This small   amount of orbicularis and roof fat was removed in this location.  Then, Bovie   electrocautery was then used to dissect down in a supraperiosteal fashion   above the orbital rim, 2 cm above the orbital rim.  After this was then   elevated, the brow was then elevated and plicated with 4-0 PDS suture to   perform a lateral canthopexy.  After this was then done, the upper lid   blepharoplasty incisions were then closed with interrupted running 6-0 nylon   suture.  This was done bilaterally.    I then turned my attention to the right side of the face.  The facelift   incisions were made and the flaps were elevated with facelift scissors after   the preplanned marks both into the face and the neck.  Bovie electrocautery   was then used to do an extensive dissection in a supraplatysmal plane.  This   was done bilaterally.  I felt that I did need to open up the neck.  Incision   was made in a preplanned submental incision.  Needle point cautery was then   used to elevate the skin and subcutaneous flaps off the medial borders of the   platysma muscle going down the level of the cricoid.  The platysmal muscle was   then slightly undermined for about a centimeter and a half and then a midline   platysmaplasty was then performed with a running locking 3-0 Ethibond suture.    I then turned my attention back to the face and neck lift flaps.  Once the   dissection was then further carried out both in the face and  neck, I then went   down into the neck and divided the platysma in a lateral to medial direction   6 cm below the inferior border of the mandible.  In the process, great care   was taken to avoid injuring the marginal mandibular cervical branch of the   facial nerve.  Then, the SMAS was then hydrodissected with saline and an   extensive SMAS flap was then elevated down to the platysma muscle in the neck.    Then, the SMAS in the platysma was trimmed and elevated as a composite flap   and secured with 3-0 Ethibond sutures.  Following this, the cavity was then   irrigated.  A 10-Vietnamese round drain was placed and secured with a 4-0 nylon   suture.  The face of neck flaps was then elevated and trimmed.  The temporal   hairline incision was then closed with 5-0 deep dermal Monocryl sutures and a   running 5-0 nylon suture.  The intertragal preauricular incision was then   closed with 5-0 Monocryl suture in front of the tragus to create a pretragal   hollow and then a running 5-0 Monocryl subcuticular stitch.  The postauricular   incision was then closed with horizontal mattress 5-0 nylon sutures in a   posterior hairline incision with interrupted running 4-0 nylon sutures.  We   turned our attention to the contralateral side where the same procedure was   performed and the same drain was placed.  I then went back to the midline   neck, which was inspected.  The patient had nice hemostasis.  This incision   was then closed with 4-0 deep dermal Monocryl sutures and running 4-0 Monocryl   subcuticular stitch.    The lip lift incision was then injected and the access incisions for the fat   grafting were then made.  The nasal base excision was then made with a 15   blade.  Needle point cautery was then used to remove the skin and tissue off   the underlying orbicularis muscle.  The lower portion of the flap was then   elevated off the orbicularis muscle for about 0.5 cm.  Then, the 5-0 Vicryl   suture was then used to place  in deep stitches to secure the nasal base.    Then, interrupted 6-0 nylon sutures were then used to close the lip lift   excision.    An 18-gauge was then used to gain access to the lateral malar eminence,   inferior malar eminence, and the lateral commissures of the lips.  Fat   grafting was then sequentially performed, fat grafting into the lateral malar   eminence and medial malar eminence in the tear trough.  About 6 mL of fat were   then placed in this location.  The fat was then grafted into the nasolabial   fold and the nasal base and the upper lip with 2 mL in each location.  Fat was   then grafted into the chin, melolabial fold, and the lower lip with about 2   mL.  With the fat grafting, a fanning technique was used.  Great care was   taken to ensure that equal amounts of fat were then placed with each passage   of the cannula.  After the fat was then placed, it was further molded into   position.  The corneal shields were then removed.  Balanced salt solution was   then used to irrigate the eyes.  The patient was washed and compressive   dressing was then placed.  She was then awakened, extubated, and transferred   to the PACU in stable condition.  At the end of procedure, all sponge,   instrument, and needle counts were correct.       ____________________________________     MD MARCOS PAZ / CLARITA    DD:  05/23/2019 11:52:38  DT:  05/23/2019 12:30:25    D#:  [id number]  Job#:  [id number]

## 2019-05-23 NOTE — ANESTHESIA TIME REPORT
Anesthesia Start and Stop Event Times     Date Time Event    5/23/2019 0729 Anesthesia Start     1147 Anesthesia Stop        Responsible Staff  05/23/19    Name Role Begin End    Paxton Mahajan M.D. Anesth 0729 1147        Preop Diagnosis (Free Text):  Pre-op Diagnosis     MALIGNANT NEOPLASM OF UPPER OUTER QUADRANT OF RIGHT BREAST, PERSONAL HISTORY OF MALIGNANT NEOPLASM OF BREAST        Preop Diagnosis (Codes):  Diagnosis Information     Diagnosis Code(s):         Post op Diagnosis  Malignant neoplasm of breast      Premium Reason  Non-Premium    Comments:

## 2019-05-23 NOTE — OP REPORT
DATE OF SERVICE:  05/23/2019    PREOPERATIVE DIAGNOSES:    1.  History of breast cancer.  2.  Asymmetry between reconstructed breasts.    POSTOPERATIVE DIAGNOSES:    1.  History of breast cancer.  2.  Asymmetry between reconstructed breasts.    PROCEDURE:    1.  Revision of bilateral reconstructed breasts with local flaps.    2.  Bilateral breast reconstruction with fat grafting.    ATTENDING SURGEON:  Richard Moreno MD    ANESTHESIOLOGIST:  Paxton Mahajan MD    ASSISTANT:  BIB Gomez    ANESTHESIA:  General.    SPECIMENS:  None.    ESTIMATED BLOOD LOSS:  Minimal.    COMPLICATIONS:  No apparent.    INDICATIONS FOR PROCEDURE:  The patient is a 78-year-old woman who is well   known to me who had previously underwent bilateral mastectomies and   reconstruction with placement of tissue expander and implants.  The patient   overall has had a stable result, but does have mild contour irregularities   that are bothersome to her.  In addition, she does have rippling from the   underlying implants.  We did discuss doing local flaps try to smooth these   areas out along with additional fat grafting.  The patient has fairly limited   amount of adipose tissue and so I did discuss the limitations of the fat   grafting procedure.  She now presents for this operation.      INTRAOPERATIVE FINDINGS:  There was about 60 mL of fat grafted in each of the   reconstructed breasts.  This portion of the operation started around 7:45 a.m.   and finished around 9:00 a.m.  The patient also had a second cosmetic   operation from about 9:00 a.m. to completion.      DESCRIPTION OF PROCEDURE:  After the operative and nonoperative options were   discussed and include was not limited to bleeding, infection, damage to   surrounding structures, need for further surgery, reaction to anesthetic   agent, scarring, breast asymmetry, wound healing difficulties, implant   failure, implant rupture, capsular contracture development,  persistent contour   irregularities, persistent dissatisfaction with appearance, wound healing   difficulties, deep venous thrombosis, pulmonary embolus, fat embolus,   myocardial infarction, stroke, unsatisfactory result, and/or death, informed   consent was obtained.      Patient was identified.  In a sitting upright position, the patient's sternal   notch midline and inframammary folds were marked.  The planned revisional   flaps were marked out medially and laterally and reconstruction bilaterally.    Area of fat graft harvest were marked in the supra and infraumbilical abdomen   and flank regions.  Antibiotics given, sequential compression devices placed.    Patient was brought back to operating room where general anesthesia was   induced.  Her chest and abdomen and flanks were prepped and draped in the   usual sterile fashion.  Poke incisions were made in the bilateral lower   quadrant and flank regions.  Tumescent solution was then placed in all   locations.  Adequate time was allowed for the hemostatic effects of   epinephrine to take effect.  A [employer] fat harvesting system was then used to   harvest fat from the supra and infraumbilical abdomen and flank and lateral   thigh.  As much fat as possible was harvested.  Overall, the patient did not   have that much adipose tissue.  After the fat was then harvested, it was then   irrigated 3 times per protocol and then placed into 20 mL syringes on the back   table.  The poke incisions in the abdomen were then closed with interrupted   5-0 fast absorbing sutures.      We turned our attention to the breast.  The local flaps were marked out for   revision, were incised with a 15 blade on the breasts bilaterally.  Cautery   was then used to elevate and remove the areas of redundancy.  The flaps were   then elevated both superiorly and inferiorly off the breast envelope.  The   advancement flaps were then created.  The standing cutaneous deformities were   excised.   The wounds were then closed with 3-0 deep dermal Monocryl sutures   and running 4-0 Monocryl subcuticular stitches.  This was done on the breasts   bilaterally.      Poke incisions were made medially and superiorly on the breasts bilaterally.    Fat was then grafted using a fanning technique.  Care was taken to ensure that   equal amounts of fat were then placed in each passage of the cannula.  Given   the limited amount of adipose tissue, I tried to disperse the fat as evenly as   possible and then the areas of most contour irregularity.  After the fat was   then placed, it was then further contoured into position.  The poke incisions   on the breasts were then closed with interrupted 5-0 fast absorbing sutures.    The patient was then washed, Steri-Strips, and compressive dressings were then   placed.  The patient tolerated the procedure well.  At the end of procedure,   all sponge, instrument and needle counts were correct.       ____________________________________     MD MARCOS PAZ / CLARITA    DD:  05/23/2019 11:42:03  DT:  05/23/2019 12:16:59    D#:  [id number]  Job#:  [id number]

## 2019-05-23 NOTE — OR SURGEON
Immediate Post OP Note    PreOp Diagnosis: history of breast cancer, breast asymmetry, cosmetic facial concerns    PostOp Diagnosis: same    Procedure(s):  RECONSTRUCTION, BREAST - Wound Class: Clean  FLAP GRAFT - REVISION DERMAL GLANDULAR  AND FAT GRAFTING - Wound Class: Clean  BLEPHAROPLASTY- UPPER - Wound Class: Clean  RHYTIDECTOMY, FACE- FACELIFT WITH PLATYSMAPLASTY AND FAT INJECTIONS W/LIP LIFT - Wound Class: Clean  RHYTIDECTOMY, FOREHEAD - LATERAL - Wound Class: Clean    Surgeon(s):  Richard Moreno M.D.    Anesthesiologist/Type of Anesthesia:  Anesthesiologist: Paxton Mahajan M.D./General    Surgical Staff:  Circulator: Jennifer Trent R.N.; Martha Sauceda R.N.  Relief Scrub: Brianne Yousif  Scrub Person: Annalee Win R.N.; Almaz Siddiqui  Private Scrub: Sulema Zamarripa    Specimens removed if any:  * No specimens in log *    Estimated Blood Loss: minimal    Findings: see operative note    Complications: no apparent    #[id number]  #[id number]      5/23/2019 11:36 AM Richard Moreno M.D.

## 2023-02-06 PROBLEM — G56.02 CARPAL TUNNEL SYNDROME ON LEFT: Status: ACTIVE | Noted: 2023-02-06

## 2023-09-05 ENCOUNTER — APPOINTMENT (OUTPATIENT)
Dept: ADMISSIONS | Facility: MEDICAL CENTER | Age: 83
End: 2023-09-05
Attending: PLASTIC SURGERY
Payer: MEDICARE

## 2023-09-06 ENCOUNTER — PRE-ADMISSION TESTING (OUTPATIENT)
Dept: ADMISSIONS | Facility: MEDICAL CENTER | Age: 83
End: 2023-09-06
Attending: PLASTIC SURGERY
Payer: MEDICARE

## 2023-09-06 DIAGNOSIS — Z01.812 PRE-OPERATIVE LABORATORY EXAMINATION: ICD-10-CM

## 2023-09-06 DIAGNOSIS — Z01.810 PRE-OPERATIVE CARDIOVASCULAR EXAMINATION: ICD-10-CM

## 2023-09-06 LAB
BASOPHILS # BLD AUTO: 1 % (ref 0–1.8)
BASOPHILS # BLD: 0.05 K/UL (ref 0–0.12)
EOSINOPHIL # BLD AUTO: 0.12 K/UL (ref 0–0.51)
EOSINOPHIL NFR BLD: 2.5 % (ref 0–6.9)
ERYTHROCYTE [DISTWIDTH] IN BLOOD BY AUTOMATED COUNT: 44.6 FL (ref 35.9–50)
HCT VFR BLD AUTO: 43.2 % (ref 37–47)
HGB BLD-MCNC: 14.4 G/DL (ref 12–16)
IMM GRANULOCYTES # BLD AUTO: 0.01 K/UL (ref 0–0.11)
IMM GRANULOCYTES NFR BLD AUTO: 0.2 % (ref 0–0.9)
LYMPHOCYTES # BLD AUTO: 1.47 K/UL (ref 1–4.8)
LYMPHOCYTES NFR BLD: 30.2 % (ref 22–41)
MCH RBC QN AUTO: 32.4 PG (ref 27–33)
MCHC RBC AUTO-ENTMCNC: 33.3 G/DL (ref 32.2–35.5)
MCV RBC AUTO: 97.3 FL (ref 81.4–97.8)
MONOCYTES # BLD AUTO: 0.37 K/UL (ref 0–0.85)
MONOCYTES NFR BLD AUTO: 7.6 % (ref 0–13.4)
NEUTROPHILS # BLD AUTO: 2.85 K/UL (ref 1.82–7.42)
NEUTROPHILS NFR BLD: 58.5 % (ref 44–72)
NRBC # BLD AUTO: 0 K/UL
NRBC BLD-RTO: 0 /100 WBC (ref 0–0.2)
PLATELET # BLD AUTO: 226 K/UL (ref 164–446)
PMV BLD AUTO: 8.8 FL (ref 9–12.9)
RBC # BLD AUTO: 4.44 M/UL (ref 4.2–5.4)
WBC # BLD AUTO: 4.9 K/UL (ref 4.8–10.8)

## 2023-09-06 PROCEDURE — 36415 COLL VENOUS BLD VENIPUNCTURE: CPT

## 2023-09-06 PROCEDURE — 85025 COMPLETE CBC W/AUTO DIFF WBC: CPT

## 2023-09-06 RX ORDER — VENLAFAXINE 37.5 MG/1
37.5 TABLET ORAL DAILY
COMMUNITY

## 2023-09-07 ENCOUNTER — ANESTHESIA (OUTPATIENT)
Dept: SURGERY | Facility: MEDICAL CENTER | Age: 83
End: 2023-09-07
Payer: MEDICARE

## 2023-09-07 ENCOUNTER — HOSPITAL ENCOUNTER (OUTPATIENT)
Facility: MEDICAL CENTER | Age: 83
End: 2023-09-07
Attending: PLASTIC SURGERY | Admitting: PLASTIC SURGERY
Payer: MEDICARE

## 2023-09-07 ENCOUNTER — ANESTHESIA EVENT (OUTPATIENT)
Dept: SURGERY | Facility: MEDICAL CENTER | Age: 83
End: 2023-09-07
Payer: MEDICARE

## 2023-09-07 VITALS
RESPIRATION RATE: 31 BRPM | OXYGEN SATURATION: 92 % | BODY MASS INDEX: 25 KG/M2 | SYSTOLIC BLOOD PRESSURE: 144 MMHG | WEIGHT: 141.09 LBS | HEART RATE: 89 BPM | TEMPERATURE: 97.1 F | HEIGHT: 63 IN | DIASTOLIC BLOOD PRESSURE: 68 MMHG

## 2023-09-07 PROBLEM — Z98.890 PONV (POSTOPERATIVE NAUSEA AND VOMITING): Status: ACTIVE | Noted: 2023-09-07

## 2023-09-07 PROBLEM — R11.2 PONV (POSTOPERATIVE NAUSEA AND VOMITING): Status: ACTIVE | Noted: 2023-09-07

## 2023-09-07 PROCEDURE — 160025 RECOVERY II MINUTES (STATS): Performed by: PLASTIC SURGERY

## 2023-09-07 PROCEDURE — 700101 HCHG RX REV CODE 250: Performed by: ANESTHESIOLOGY

## 2023-09-07 PROCEDURE — A9270 NON-COVERED ITEM OR SERVICE: HCPCS | Performed by: ANESTHESIOLOGY

## 2023-09-07 PROCEDURE — 700105 HCHG RX REV CODE 258: Performed by: PLASTIC SURGERY

## 2023-09-07 PROCEDURE — 160046 HCHG PACU - 1ST 60 MINS PHASE II: Performed by: PLASTIC SURGERY

## 2023-09-07 PROCEDURE — 700101 HCHG RX REV CODE 250: Performed by: PLASTIC SURGERY

## 2023-09-07 PROCEDURE — 160035 HCHG PACU - 1ST 60 MINS PHASE I: Performed by: PLASTIC SURGERY

## 2023-09-07 PROCEDURE — 160002 HCHG RECOVERY MINUTES (STAT): Performed by: PLASTIC SURGERY

## 2023-09-07 PROCEDURE — 160048 HCHG OR STATISTICAL LEVEL 1-5: Performed by: PLASTIC SURGERY

## 2023-09-07 PROCEDURE — 700102 HCHG RX REV CODE 250 W/ 637 OVERRIDE(OP): Performed by: ANESTHESIOLOGY

## 2023-09-07 PROCEDURE — 160042 HCHG SURGERY MINUTES - EA ADDL 1 MIN LEVEL 5: Performed by: PLASTIC SURGERY

## 2023-09-07 PROCEDURE — 700111 HCHG RX REV CODE 636 W/ 250 OVERRIDE (IP): Performed by: ANESTHESIOLOGY

## 2023-09-07 PROCEDURE — C1789 PROSTHESIS, BREAST, IMP: HCPCS | Performed by: PLASTIC SURGERY

## 2023-09-07 PROCEDURE — 700111 HCHG RX REV CODE 636 W/ 250 OVERRIDE (IP): Mod: JZ | Performed by: PLASTIC SURGERY

## 2023-09-07 PROCEDURE — 160031 HCHG SURGERY MINUTES - 1ST 30 MINS LEVEL 5: Performed by: PLASTIC SURGERY

## 2023-09-07 PROCEDURE — 160009 HCHG ANES TIME/MIN: Performed by: PLASTIC SURGERY

## 2023-09-07 DEVICE — IMPLANTABLE DEVICE: Type: IMPLANTABLE DEVICE | Site: BREAST | Status: FUNCTIONAL

## 2023-09-07 RX ORDER — CEFAZOLIN SODIUM 1 G/3ML
INJECTION, POWDER, FOR SOLUTION INTRAMUSCULAR; INTRAVENOUS
Status: DISCONTINUED
Start: 2023-09-07 | End: 2023-09-07 | Stop reason: HOSPADM

## 2023-09-07 RX ORDER — ONDANSETRON 2 MG/ML
INJECTION INTRAMUSCULAR; INTRAVENOUS PRN
Status: DISCONTINUED | OUTPATIENT
Start: 2023-09-07 | End: 2023-09-07 | Stop reason: SURG

## 2023-09-07 RX ORDER — SODIUM CHLORIDE, SODIUM LACTATE, POTASSIUM CHLORIDE, CALCIUM CHLORIDE 600; 310; 30; 20 MG/100ML; MG/100ML; MG/100ML; MG/100ML
INJECTION, SOLUTION INTRAVENOUS CONTINUOUS
Status: DISCONTINUED | OUTPATIENT
Start: 2023-09-07 | End: 2023-09-07 | Stop reason: HOSPADM

## 2023-09-07 RX ORDER — CELECOXIB 200 MG/1
200 CAPSULE ORAL ONCE
Status: COMPLETED | OUTPATIENT
Start: 2023-09-07 | End: 2023-09-07

## 2023-09-07 RX ORDER — HYDRALAZINE HYDROCHLORIDE 20 MG/ML
5 INJECTION INTRAMUSCULAR; INTRAVENOUS
Status: DISCONTINUED | OUTPATIENT
Start: 2023-09-07 | End: 2023-09-07 | Stop reason: HOSPADM

## 2023-09-07 RX ORDER — BUPIVACAINE HYDROCHLORIDE 5 MG/ML
INJECTION, SOLUTION EPIDURAL; INTRACAUDAL
Status: DISCONTINUED
Start: 2023-09-07 | End: 2023-09-07 | Stop reason: HOSPADM

## 2023-09-07 RX ORDER — LABETALOL HYDROCHLORIDE 5 MG/ML
5 INJECTION, SOLUTION INTRAVENOUS
Status: DISCONTINUED | OUTPATIENT
Start: 2023-09-07 | End: 2023-09-07 | Stop reason: HOSPADM

## 2023-09-07 RX ORDER — ONDANSETRON 2 MG/ML
4 INJECTION INTRAMUSCULAR; INTRAVENOUS
Status: DISCONTINUED | OUTPATIENT
Start: 2023-09-07 | End: 2023-09-07 | Stop reason: HOSPADM

## 2023-09-07 RX ORDER — MAGNESIUM SULFATE HEPTAHYDRATE 40 MG/ML
INJECTION, SOLUTION INTRAVENOUS PRN
Status: DISCONTINUED | OUTPATIENT
Start: 2023-09-07 | End: 2023-09-07 | Stop reason: SURG

## 2023-09-07 RX ORDER — LIDOCAINE HYDROCHLORIDE 20 MG/ML
INJECTION, SOLUTION EPIDURAL; INFILTRATION; INTRACAUDAL; PERINEURAL PRN
Status: DISCONTINUED | OUTPATIENT
Start: 2023-09-07 | End: 2023-09-07 | Stop reason: SURG

## 2023-09-07 RX ORDER — CEFAZOLIN SODIUM 1 G/3ML
INJECTION, POWDER, FOR SOLUTION INTRAMUSCULAR; INTRAVENOUS PRN
Status: DISCONTINUED | OUTPATIENT
Start: 2023-09-07 | End: 2023-09-07 | Stop reason: SURG

## 2023-09-07 RX ORDER — DEXAMETHASONE SODIUM PHOSPHATE 4 MG/ML
INJECTION, SOLUTION INTRA-ARTICULAR; INTRALESIONAL; INTRAMUSCULAR; INTRAVENOUS; SOFT TISSUE PRN
Status: DISCONTINUED | OUTPATIENT
Start: 2023-09-07 | End: 2023-09-07 | Stop reason: SURG

## 2023-09-07 RX ORDER — ACETAMINOPHEN 500 MG
1000 TABLET ORAL ONCE
Status: COMPLETED | OUTPATIENT
Start: 2023-09-07 | End: 2023-09-07

## 2023-09-07 RX ORDER — BUPIVACAINE HYDROCHLORIDE AND EPINEPHRINE 5; 5 MG/ML; UG/ML
INJECTION, SOLUTION EPIDURAL; INTRACAUDAL; PERINEURAL
Status: DISCONTINUED | OUTPATIENT
Start: 2023-09-07 | End: 2023-09-07 | Stop reason: HOSPADM

## 2023-09-07 RX ORDER — GENTAMICIN SULFATE 40 MG/ML
INJECTION, SOLUTION INTRAMUSCULAR; INTRAVENOUS
Status: DISCONTINUED
Start: 2023-09-07 | End: 2023-09-07 | Stop reason: HOSPADM

## 2023-09-07 RX ORDER — OXYCODONE HCL 5 MG/5 ML
5 SOLUTION, ORAL ORAL
Status: DISCONTINUED | OUTPATIENT
Start: 2023-09-07 | End: 2023-09-07 | Stop reason: HOSPADM

## 2023-09-07 RX ORDER — EPHEDRINE SULFATE 50 MG/ML
INJECTION, SOLUTION INTRAVENOUS PRN
Status: DISCONTINUED | OUTPATIENT
Start: 2023-09-07 | End: 2023-09-07 | Stop reason: SURG

## 2023-09-07 RX ADMIN — MAGNESIUM SULFATE HEPTAHYDRATE 2 G: 2 INJECTION, SOLUTION INTRAVENOUS at 11:38

## 2023-09-07 RX ADMIN — EPHEDRINE SULFATE 5 MG: 50 INJECTION, SOLUTION INTRAVENOUS at 11:53

## 2023-09-07 RX ADMIN — FENTANYL CITRATE 25 MCG: 50 INJECTION, SOLUTION INTRAMUSCULAR; INTRAVENOUS at 11:56

## 2023-09-07 RX ADMIN — EPHEDRINE SULFATE 5 MG: 50 INJECTION, SOLUTION INTRAVENOUS at 11:55

## 2023-09-07 RX ADMIN — FENTANYL CITRATE 25 MCG: 50 INJECTION, SOLUTION INTRAMUSCULAR; INTRAVENOUS at 12:09

## 2023-09-07 RX ADMIN — LIDOCAINE HYDROCHLORIDE 60 MG: 20 INJECTION, SOLUTION EPIDURAL; INFILTRATION; INTRACAUDAL at 11:24

## 2023-09-07 RX ADMIN — CELECOXIB 200 MG: 200 CAPSULE ORAL at 10:07

## 2023-09-07 RX ADMIN — FENTANYL CITRATE 50 MCG: 50 INJECTION, SOLUTION INTRAMUSCULAR; INTRAVENOUS at 11:24

## 2023-09-07 RX ADMIN — FENTANYL CITRATE 25 MCG: 50 INJECTION, SOLUTION INTRAMUSCULAR; INTRAVENOUS at 11:36

## 2023-09-07 RX ADMIN — ACETAMINOPHEN 1000 MG: 500 TABLET ORAL at 10:07

## 2023-09-07 RX ADMIN — SODIUM CHLORIDE, POTASSIUM CHLORIDE, SODIUM LACTATE AND CALCIUM CHLORIDE: 600; 310; 30; 20 INJECTION, SOLUTION INTRAVENOUS at 11:19

## 2023-09-07 RX ADMIN — EPHEDRINE SULFATE 5 MG: 50 INJECTION, SOLUTION INTRAVENOUS at 12:07

## 2023-09-07 RX ADMIN — SODIUM CHLORIDE, POTASSIUM CHLORIDE, SODIUM LACTATE AND CALCIUM CHLORIDE: 600; 310; 30; 20 INJECTION, SOLUTION INTRAVENOUS at 12:18

## 2023-09-07 RX ADMIN — DEXAMETHASONE SODIUM PHOSPHATE 8 MG: 4 INJECTION INTRA-ARTICULAR; INTRALESIONAL; INTRAMUSCULAR; INTRAVENOUS; SOFT TISSUE at 11:29

## 2023-09-07 RX ADMIN — PROPOFOL 100 MG: 10 INJECTION, EMULSION INTRAVENOUS at 11:24

## 2023-09-07 RX ADMIN — CEFAZOLIN 2 G: 1 INJECTION, POWDER, FOR SOLUTION INTRAMUSCULAR; INTRAVENOUS at 11:24

## 2023-09-07 RX ADMIN — ONDANSETRON 4 MG: 2 INJECTION INTRAMUSCULAR; INTRAVENOUS at 12:06

## 2023-09-07 ASSESSMENT — PAIN DESCRIPTION - PAIN TYPE
TYPE: SURGICAL PAIN

## 2023-09-07 ASSESSMENT — FIBROSIS 4 INDEX: FIB4 SCORE: 1.99

## 2023-09-07 ASSESSMENT — PAIN SCALES - GENERAL: PAIN_LEVEL: 3

## 2023-09-07 NOTE — ANESTHESIA PREPROCEDURE EVALUATION
Case: [id number] Date/Time: 09/07/23 1045    Procedures:       BILATERAL BREAST IMPLANT EXCHANGE WITH BREAST RECONSTRUCTION AND REVISION WITH DERMAL GLANDULAR FLAPS AND CAPSULECTOMY (Bilateral: Breast)      RECONSTRUCTION, BREAST (Bilateral: Breast)      FLAP GRAFT (Bilateral: Breast)    Anesthesia type: General    Pre-op diagnosis: PERSONAL HISTORY OF MALIGNANT NEOPLASM OF BREAST, MALIGNANT NEOPLASM OF UPPER OUTER QUADRANT OF RIGHT BREAST    Location: [facility] ROOM 28 / SURGERY SAME DAY [facility]    Surgeons: Richard Moreno M.D.        81 yo w/hx left breast cancer    Relevant Problems   ANESTHESIA   (positive) PONV (postoperative nausea and vomiting)     occ GERD    Denies CAD, CP/SOB, CVA, HTN, DM, LUNG/LIVER/KIDNEY DZ, URI    Physical Exam    Airway   Mallampati: II  TM distance: >3 FB  Neck ROM: full       Cardiovascular   Rhythm: regular  Rate: normal  (-) murmur     Dental - normal exam           Pulmonary   Breath sounds clear to auscultation     Abdominal    Neurological - normal exam                 Anesthesia Plan    ASA 2       Plan - general       Airway plan will be LMA          Induction: intravenous    Postoperative Plan: Postoperative administration of opioids is intended.    Pertinent diagnostic labs and testing reviewed    Informed Consent:    Anesthetic plan and risks discussed with patient.    Use of blood products discussed with: patient whom consented to blood products.

## 2023-09-07 NOTE — ANESTHESIA PROCEDURE NOTES
Airway    Date/Time: 9/7/2023 11:26 AM    Performed by: Tonia Cox M.D.  Authorized by: Tonia Cox M.D.    Location:  OR  Urgency:  Elective  Indications for Airway Management:  Anesthesia      Spontaneous Ventilation: absent    Sedation Level:  Deep  Preoxygenated: Yes    Mask Difficulty Assessment:  0 - not attempted  Final Airway Type:  Supraglottic airway  Final Supraglottic Airway:  Standard LMA    SGA Size:  4  Number of Attempts at Approach:  1

## 2023-09-07 NOTE — OR SURGEON
Immediate Post OP Note    PreOp Diagnosis: history of breast cancer, breast asymmetry      PostOp Diagnosis: same      Procedure(s):  BILATERAL BREAST IMPLANT EXCHANGE WITH BREAST RECONSTRUCTION AND REVISION WITH DERMAL GLANDULAR FLAPS AND CAPSULECTOMY - Wound Class: Clean  RECONSTRUCTION, BREAST - Wound Class: Clean  FLAP GRAFT - Wound Class: Clean    Surgeon(s):  Richard Moreno M.D.    Anesthesiologist/Type of Anesthesia:  Anesthesiologist: Tonia Cox M.D./General    Surgical Staff:  Circulator: Pauly Milian R.N.; Martha Serra R.N.  Scrub Person: Conchita Zaragoza; Milagros Young    Specimens removed if any:  * No specimens in log *    Estimated Blood Loss: minimal    Findings: see operative note    Complications: no apparent    #[id number]        9/7/2023 11:02 AM Richard Moreno M.D.

## 2023-09-07 NOTE — ANESTHESIA TIME REPORT
Anesthesia Start and Stop Event Times     Date Time Event    9/7/2023 1103 Ready for Procedure     1119 Anesthesia Start     1223 Anesthesia Stop        Responsible Staff  09/07/23    Name Role Begin End    Tonia Cox M.D. Anesth 1119 1223        Overtime Reason:  no overtime (within assigned shift)    Comments:

## 2023-09-07 NOTE — OP REPORT
DATE OF SERVICE:  09/07/2023     PREOPERATIVE DIAGNOSES:  1.  History of breast cancer.  2.  Deformity of reconstructed breast.  3.  Asymmetry between reconstructed breasts.     POSTOPERATIVE DIAGNOSES:  1.  History of breast cancer.  2.  Deformity of reconstructed breast.  3.  Asymmetry between reconstructed breasts.     PROCEDURES:  1.  Removal of bilateral intact textured silicone implants.  2.  Revision of bilateral reconstructed breasts with laterally based   dermoglandular flaps 25 cm2 per breast for a total of 50 cm2.  3.  Bilateral medial capsulotomies and lateral capsulorrhaphies.  4.  Bilateral smooth silicone implant placement for breast reconstruction.     ATTENDING SURGEON:  Richard Moreno MD     ANESTHESIOLOGIST:  Tonia Cox MD     ESTIMATED BLOOD LOSS:  Minimal.     COMPLICATIONS:  No apparent.     SPECIMENS:  None.     INDICATIONS FOR PROCEDURE:  The patient is an 82-year-old woman who had   previously undergone bilateral subpectoral breast reconstruction with tissue   expander and implants.  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  She also feels like the implants are too large and so we   discussed removing the textured implants and placing smooth implants and   revising her reconstruction.  She now presents for the above operation.     INTRAOPERATIVE FINDINGS:  The implants that were removed were Allergan Biocell   reference #[id number].  Both implants were intact.  The implants that were placed   bilaterally were Allergan Style SSM Natrelle Inspira SoftTouch breast   implants 445 mL, reference # [id number].  On the right hand side, the serial   number is [id number], on the left hand side, serial number is [id number].     PROCEDURE:  After the operative and nonoperative options were discussed   including risks, benefits and alternatives, which included, but was not   limited to bleeding,  infection, damage to surrounding structure, need for   further surgery, reaction to anesthetic agent, scarring, breast asymmetry,   contour irregularities, wound healing difficulties, need for revisional   surgery, implant failure, implant rupture, capsular contracture development,   breast implant illness, breast implant associated anaplastic large cell   lymphoma, wound healing difficulties, deep venous thrombosis, pulmonary   embolus, myocardial infarction, stroke, and unsatisfactory result and/or   death, informed consent was obtained.  Preoperatively, the patient was   identified.  In the sitting upright position, the patient's sternal notch,   midline and inframammary folds were marked.  The planned revisional flaps were   drawn out bilaterally.  The right breast was more bottomed out than the left   breast.  Antibiotics were given and sequential compression devices were   placed.  The patient was brought back to the operating room and general   anesthesia was induced.  Her chest was prepped and draped in the usual sterile   fashion.  Starting on the right hand side, an incision was made through the   old mastectomy scar.  Cautery was used to dissect down through the underlying   tissue, down to the pectoralis muscle down through the underlying capsule,   which was opened up.  The implant was grasped and removed.  It was an [employer]   Biocell implant that was 560 mL and was style TSM.  The pocket was then   irrigated with antibiotic irrigation.  There were no periprosthetic fluid   collections or suspicious findings.  Cautery was then used to perform   capsulotomies medially and superiorly along the pocket.  Then, out laterally,   extensive capsulorrhaphies were performed with running locking 2-0 Ethibond   sutures and along the lateral inframammary fold.  In doing so, medialized the   pocket.  Following this, the patient was put in a slightly upright position.    Different sizers were tried out.  Ultimately  felt that the 445 mL moderate   smooth implant would be most appropriate.     While in an upright position, then laterally based dermoglandular flap was   then temporarily tailor tacked.  Once I was then happy with the markings, it   was incised with a 10 blade.  A portion of tissue was then deepithelialized   and cautery was used to elevate superiorly, elevating the mastectomy skin   flap.  Then inferiorly, the dermoglandular flap was then mobilized off the   underlying breast implant capsule.  The flap was then mobilized in an inferior   to superior direction was then tacked down to the capsule in a   pants-over-vest fashion with 2-0 Vicryl sutures.  Then, the standing cutaneous   deformities were excised medially and laterally.  The wound was then   temporarily tailor tacked shut.  This area was approximately 25 cm2.     The patient was put back in supine position.  The sizer was removed.  The   pocket was then copiously irrigated with antibiotic irrigation.  Overall, the   capsule was extremely thin and I did not feel that a capsulectomy was   warranted given the extremely thin nature of the capsule.  So, the pocket was   then again irrigated with antibiotic irrigation and dilute Betadine.  Then,   using new gloves and minimal touch technique, the 445 mL implant was then   placed in the pocket.  The deep tissue was then closed with 2-0 Vicryl   sutures.  The dermis with all the cutaneous incisions were then closed with   3-0 deep dermal Monocryl sutures and running 4-0 Monocryl subcuticular stitch   was used to close the overlying skin.     We then turned our attention to the contralateral breast where the same   procedure was performed and the same implant was removed that was an [email or web address]   Biocell implant that was  Ml.  The same implant was placed in 445 mL.    The same capsulotomies and capsulorrhaphies were performed medially and   laterally and the laterally based dermoglandular flap was then created  in   similar fashion and closed in similar fashion.  The remainder of incisions   were then closed in similar fashion.  The patient was then washed.    Steri-Strips and compressive dressings were then placed.  The patient was then   awakened, extubated and transferred to PACU in stable condition.  At the end   of the procedure, all sponge, instrument and needle counts were correct.        ______________________________  MD MARCOS PAZ/MARION    DD:  09/07/2023 12:25  DT:  09/07/2023 13:44    Job#:  [id number]

## 2023-09-07 NOTE — OR NURSING
1220 rom OR to PACU 4. Connected to monitor. Report received from anesthesia & RN. VSS. 02 4L via mask with no oral airway. Breaths calm, even, unlabored. No C/O pain.  Surgical dressing and binder in place, clean dry and intact.      1249 Spouse updated via phone call; states he is in the hospital waiting. Will call again when patient ready for discharge. Patient resting comfortably at this time      1300 Vital signs stable on room air, patient tolerating po fluids. Denies nausea. States pain is minimal, declines pain medication at this time.     1315 Discharge instructions reviewed with spouse in lobby. All questions answered, verbalizes understanding.     1320 IV and ID bands removed, assisted to change into own clothing. All personal belongings & discharge instructions with patient.    1330 Escorted to car via wheelchair, accompanied by RN.

## 2023-09-07 NOTE — DISCHARGE INSTRUCTIONS
Keep dressing dry and clean for 3 days then okay to shower.    Cool compresses as needed.    Minimize arm motion.    If any questions arise, call your provider.  If your provider is not available, please feel free to call the [facility] at [phone].    MEDICATIONS: Resume taking daily medication.  Take prescribed pain medication with food.  If no medication is prescribed, you may take non-aspirin pain medication if needed.  PAIN MEDICATION CAN BE VERY CONSTIPATING.  Take a stool softener or laxative such as senokot, pericolace, or milk of magnesia if needed.    Last pain medication given at     10:07 Tylenol & Celebrex (anti-inflammatory like Ibuprofen).   May take Ibuprofen next at 4:00 pm.   May take Tylenol again at 4:00 pm. Do not take if your pain medicine has Tylenol (Acetaminophen) in it.     What to Expect Post Anesthesia    Rest and take it easy for the first 24 hours.  A responsible adult is recommended to remain with you during that time.  It is normal to feel sleepy.  We encourage you to not do anything that requires balance, judgment or coordination.    FOR 24 HOURS DO NOT:  Drive, operate machinery or run household appliances.  Drink beer or alcoholic beverages.  Make important decisions or sign legal documents.    To avoid nausea, slowly advance diet as tolerated, avoiding spicy or greasy foods for the first day.  Add more substantial food to your diet according to your provider's instructions.  Babies can be fed formula or breast milk as soon as they are hungry.  INCREASE FLUIDS AND FIBER TO AVOID CONSTIPATION.    MILD FLU-LIKE SYMPTOMS ARE NORMAL.  YOU MAY EXPERIENCE GENERALIZED MUSCLE ACHES, THROAT IRRITATION, HEADACHE AND/OR SOME NAUSEA.

## 2023-09-07 NOTE — ANESTHESIA POSTPROCEDURE EVALUATION
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 Responsible Provider: Tonia Cox M.D.    Anesthesia Type: general ASA Status: 2          Final Anesthesia Type: general  Last vitals  BP   Blood Pressure : (!) 144/68    Temp   36.2 °C (97.1 °F)    Pulse   89   Resp   13   SpO2   92 %      Anesthesia Post Evaluation    Patient location during evaluation: PACU  Patient participation: complete - patient participated  Level of consciousness: awake  Pain score: 3    Airway patency: patent  Anesthetic complications: no  Cardiovascular status: hemodynamically stable  Respiratory status: acceptable  Hydration status: acceptable    PONV: none          No notable events documented.     Nurse Pain Score: 3 (NPRS)

## (undated) DEVICE — PACK BREAST RECONSTRUCTION (2EA/CA)

## (undated) DEVICE — SYRINGE SAFETY TB 1 ML 27 GA X 1/2 IN (100/BX 4BX/CA)

## (undated) DEVICE — Device

## (undated) DEVICE — SUTURE 4-0 MONOCRYL PLUS PS-2 - 27 INCH (36/BX)

## (undated) DEVICE — CLOSURE SKIN STRIP 1/2 X 4 IN - (STERI STRIP) (50/BX 4BX/CA)

## (undated) DEVICE — TUBING PAL ASPIRATION LIPOSUC 12FT (5EA/PK)

## (undated) DEVICE — SUTURE GENERAL

## (undated) DEVICE — SPANDAGE CHEST SZ10 25YDS - STRETCH (21 EA/CA 1RL/CA)

## (undated) DEVICE — DRAPE SURGICAL U 77X120 - (10/CA)

## (undated) DEVICE — TOWEL STOP TIMEOUT SAFETY FLAG (40EA/CA)

## (undated) DEVICE — SUTURE 3-0 MONOCRYL PLUS PS-2 - (12/BX)

## (undated) DEVICE — SUTURE 5-0 ETHILON P-3 18 (12PK/BX)"

## (undated) DEVICE — NEEDLE SAFETY 18 GA X 1 1/2 IN (100EA/BX)

## (undated) DEVICE — 4-0 PDS RB-1

## (undated) DEVICE — SUTURE 5-0 MERSIL S-14 (12PK/BX)

## (undated) DEVICE — ELECTRODE 850 FOAM ADHESIVE - HYDROGEL RADIOTRNSPRNT (50/PK)

## (undated) DEVICE — WRAP CO-FLEX 4IN X 5YD STERIL - SELF-ADHERENT (18/CA)

## (undated) DEVICE — SUCTION INSTRUMENT YANKAUER BULBOUS TIP W/O VENT (50EA/CA)

## (undated) DEVICE — KIT ANESTHESIA W/CIRCUIT & 3/LT BAG W/FILTER (20EA/CA)

## (undated) DEVICE — SUCTION INSTRUMENT YANKAUER OPEN TIP W/O VENT (50EA/CA)

## (undated) DEVICE — SUTURE 4-0 30CM STRATAFIX SPIRAL PS-2 (12EA/BX)

## (undated) DEVICE — PROTECTOR ULNA NERVE - (36PR/CA)

## (undated) DEVICE — SUTURE 3-0 ETHILON FS-1 - (36/BX) 30 INCH

## (undated) DEVICE — SPANDAGE SZ 6 ELASTIC NET - (25YD/CA)

## (undated) DEVICE — SODIUM CHL IRRIGATION 0.9% 1000ML (12EA/CA)

## (undated) DEVICE — SUTURE 5-0 PLAIN GUT PC-1 - (12/BX)

## (undated) DEVICE — TUBE E-T HI-LO CUFF 7.0MM (10EA/PK)

## (undated) DEVICE — SUTURE EYE

## (undated) DEVICE — BOVIE NEEDLE TIP INSULATD NON-SAFETY 2CM (50/PK)

## (undated) DEVICE — GOWN WARMING STANDARD FLEX - (30/CA)

## (undated) DEVICE — SYRINGE 50ML CATHETER TIP (40EA/BX 4BX/CA)

## (undated) DEVICE — TUBING CLEARLINK DUO-VENT - C-FLO (48EA/CA)

## (undated) DEVICE — BANDAGE ROLL STERILE BULKEE 4.5 IN X 4 YD (100EA/CA)

## (undated) DEVICE — SYRINGE DISP. 50CC LS - (40/BX)

## (undated) DEVICE — CANISTER SUCTION 3000ML MECHANICAL FILTER AUTO SHUTOFF MEDI-VAC NONSTERILE LF DISP  (40EA/CA)

## (undated) DEVICE — KIT  I.V. START (100EA/CA)

## (undated) DEVICE — PENCIL ELECTSURG 10FT BTN SWH - (50/CA)

## (undated) DEVICE — BLADE SURGICAL #15 - (50/BX 3BX/CA)

## (undated) DEVICE — SUTURE 2-0 PDS II CT-2 - (36/BX)

## (undated) DEVICE — GLOVE BIOGEL SZ 8 SURGICAL PF LTX - (50PR/BX 4BX/CA)

## (undated) DEVICE — GLOVE BIOGEL PI INDICATOR SZ 7.5 SURGICAL PF LF -(50/BX 4BX/CA)

## (undated) DEVICE — SUTURE 3-0 ETHIBOND RB-1 (36PK/BX)

## (undated) DEVICE — SET LEADWIRE 5 LEAD BEDSIDE DISPOSABLE ECG (1SET OF 5/EA)

## (undated) DEVICE — ELECTRODE DUAL RETURN W/ CORD - (50/PK)

## (undated) DEVICE — GLOVE BIOGEL SZ 6 PF LATEX - (50EA/BX 4BX/CA)

## (undated) DEVICE — WATER IRRIGATION STERILE 1000ML (12EA/CA)

## (undated) DEVICE — TRAY SRGPRP PVP IOD WT PRP - (20/CA)

## (undated) DEVICE — GOWN SURGEONS X-LARGE - DISP. (30/CA)

## (undated) DEVICE — GLOVE SZ 7.5 BIOGEL PI MICRO - PF LF (50PR/BX)

## (undated) DEVICE — TUBE CONNECTING SUCTION - CLEAR PLASTIC STERILE 72 IN (50EA/CA)

## (undated) DEVICE — SUTURE 5-0 MONOCRYL PLUS P-3 - 18 INCH (12/BX)

## (undated) DEVICE — SUTURE 2-0 VICRYL PLUS SH - 8 X 18 INCH (12/BX)

## (undated) DEVICE — GLOVE LITE HANDLE DISP. - (1/PK 80PK/CS)

## (undated) DEVICE — SENSOR OXIMETER ADULT SPO2 RD SET (20EA/BX)

## (undated) DEVICE — PROTECTOR CORNEAL - (10/BX)

## (undated) DEVICE — SENSOR SPO2 NEO LNCS ADHESIVE (20/BX) SEE USER NOTES

## (undated) DEVICE — GRAFTING FAT REVOLVE (1EA/PK)

## (undated) DEVICE — SET EXTENSION WITH 2 PORTS (48EA/CA) ***PART #2C8610 IS A SUBSTITUTE*****

## (undated) DEVICE — HEAD HOLDER JUNIOR/ADULT

## (undated) DEVICE — GLOVE SZ 6 BIOGEL PI MICRO - PF LF (50PR/BX 4BX/CA)

## (undated) DEVICE — CLOSURE WOUND 1/4 X 4 (STERI - STRIP) (50/BX 4BX/CA)

## (undated) DEVICE — STAPLER SKIN DISP - (6/BX 10BX/CA) VISISTAT

## (undated) DEVICE — PEN SKIN MARKER W/RULER - (50EA/BX)

## (undated) DEVICE — TOWELS CLOTH SURGICAL - (4/PK 20PK/CA)

## (undated) DEVICE — SOLUTION PREP PVP IODINE 3/4 OZ POUCH PACKET CONTAINER STERILE LATEX FREE

## (undated) DEVICE — SLEEVE VASO CALF MED - (10PR/CA)

## (undated) DEVICE — CATHETER IV 20 GA X 1-1/4 ---SURG.& SDS ONLY--- (50EA/BX)

## (undated) DEVICE — LACTATED RINGERS INJ 1000 ML - (14EA/CA 60CA/PF)

## (undated) DEVICE — CHLORAPREP 26 ML APPLICATOR - ORANGE TINT(25/CA)

## (undated) DEVICE — NEEDLE NON SAFETY 25 GA X 1 1/2 IN HYPO (100EA/BX)

## (undated) DEVICE — SUTURE 4-0 MONOCRYL PLUS P-3 (12PK/BX)

## (undated) DEVICE — BALL COTTON STERILE 5/PK - (5/PK 25PK/CA)

## (undated) DEVICE — DRESSING XEROFORM 1X8 - (50/BX 4BX/CA)

## (undated) DEVICE — CANISTER SUCTION RIGID RED 1500CC (40EA/CA)

## (undated) DEVICE — CORDS BIPOLAR COAGULATION - 12FT STERILE DISP. (10EA/BX)

## (undated) DEVICE — SPONGE XRAY 8X4 STERL. 12PL - (10EA/TY 80TY/CA)

## (undated) DEVICE — MASK OXYGEN VNYL ADLT MED CONC WITH 7 FOOT TUBING  - (50EA/CA)

## (undated) DEVICE — DRAPE LARGE 3 QUARTER - (20/CA)

## (undated) DEVICE — PLUMEPEN ULTRA 3/8 IN X 10 FT HOSE (20EA/CA)

## (undated) DEVICE — DRESSING TOPIFOAM 8 X 12 (30EA/BX)

## (undated) DEVICE — SUTURE 2-0 SILK 12 X 18" (36PK/BX)"

## (undated) DEVICE — ANTI-FOG SOLUTION - 60BTL/CA

## (undated) DEVICE — GLOVE BIOGEL INDICATOR SZ 8.5 SURGICAL PF LTX - (50/BX 4BX/CA)

## (undated) DEVICE — GOWN SURGEONS LARGE - (32/CA)

## (undated) DEVICE — MASK ANESTHESIA ADULT  - (100/CA)

## (undated) DEVICE — SPONGE GAUZESTER 4 X 4 4PLY - (128PK/CA)

## (undated) DEVICE — NEPTUNE 4 PORT MANIFOLD - (20/PK)

## (undated) DEVICE — DRESSING ABDOMINAL PAD STERILE 8 X 10" (360EA/CA)"

## (undated) DEVICE — DRAIN BLAKE 10FR 3/4 FLUTED SILICONE CLOSED WOUND SUCTION CHANNEL  - (10/CA)

## (undated) DEVICE — NEEDLE NON SAFETY 27GA X 1-1/4 IN HYPO (100EA/BX)

## (undated) DEVICE — CANNULA O2 COMFORT SOFT EAR ADULT 7 FT TUBING (50/CA)

## (undated) DEVICE — RETRACTOR LIGHTED RADIALUX

## (undated) DEVICE — APPLICATOR COTTONTIP 3 IN - STERILE (10EA/PK 100PK/CA)

## (undated) DEVICE — APPLICATOR COTTON TIP 6 IN - STERILE (2EA/PK 100PK/BX)

## (undated) DEVICE — SUTURE 4-0 ETHILON FS-2 18 (36PK/BX)"

## (undated) DEVICE — GLOVE SZ 6.5 BIOGEL PI MICRO - PF LF (50PR/BX)